# Patient Record
Sex: MALE | Race: WHITE | NOT HISPANIC OR LATINO | URBAN - METROPOLITAN AREA
[De-identification: names, ages, dates, MRNs, and addresses within clinical notes are randomized per-mention and may not be internally consistent; named-entity substitution may affect disease eponyms.]

---

## 2024-11-08 ENCOUNTER — OFFICE VISIT (OUTPATIENT)
Dept: URGENT CARE | Age: 24
End: 2024-11-08
Payer: COMMERCIAL

## 2024-11-08 VITALS
HEIGHT: 74 IN | WEIGHT: 200 LBS | DIASTOLIC BLOOD PRESSURE: 73 MMHG | RESPIRATION RATE: 16 BRPM | BODY MASS INDEX: 25.67 KG/M2 | SYSTOLIC BLOOD PRESSURE: 128 MMHG | OXYGEN SATURATION: 96 % | TEMPERATURE: 97.8 F | HEART RATE: 84 BPM

## 2024-11-08 DIAGNOSIS — J06.9 VIRAL URI: Primary | ICD-10-CM

## 2024-11-08 DIAGNOSIS — H65.91 OTHER NONSUPPURATIVE OTITIS MEDIA OF RIGHT EAR, UNSPECIFIED CHRONICITY: ICD-10-CM

## 2024-11-08 RX ORDER — AZITHROMYCIN 250 MG/1
TABLET, FILM COATED ORAL
Qty: 6 TABLET | Refills: 0 | Status: SHIPPED | OUTPATIENT
Start: 2024-11-08 | End: 2024-11-13

## 2024-11-08 ASSESSMENT — PAIN SCALES - GENERAL: PAINLEVEL_OUTOF10: 5

## 2024-11-08 NOTE — PROGRESS NOTES
"Subjective   Patient ID: Brad Meek is a 24 y.o. male. They present today with a chief complaint of URI (X 1 week, recently traveled), Sore Throat, Nasal Congestion, Fever, Cough, and Earache (Muffled sound).    History of Present Illness  HPI  7 Days of ear pain, right worse than left. Mild nasal congestion with postnasal drip. No fevers or chills. No eye redness, discharge or itching.  No blood or discharge noted from ear.  No ear tenderness.  No nausea vomiting or diarrhea. No chest pain, shortness of breath or wheezing noted. No rashes or skin lesions noted. No known exposures to Mono, strep, pneumonia or influenza. Over-the-counter medications taken for symptoms. Nonsmoker.  7   Past Medical History  Allergies as of 11/08/2024    (No Known Allergies)       (Not in a hospital admission)       No past medical history on file.    No past surgical history on file.     reports that he has never smoked. He has never used smokeless tobacco.    Review of Systems  Review of Systems       As in history of present illness                        Objective    Vitals:    11/08/24 0907   BP: 128/73   BP Location: Left arm   Patient Position: Sitting   BP Cuff Size: Large adult   Pulse: 84   Resp: 16   Temp: 36.6 °C (97.8 °F)   SpO2: 96%   Weight: 90.7 kg (200 lb)   Height: 1.88 m (6' 2\")     No LMP for male patient.    Physical Exam  Gen- A&O. NAD at rest.   Ears-ear canals appear normal bilaterally.  Bilateral effusions noted behind TMs, right TM red and dull  OP-no erythema or exudates. Some mucous in posterior OP   Neck- mild anterior lymphadenopathy  Lungs- clear to auscultation without wheezes or rhonchi  Skin-no rashes, hives or lesions  Procedures    Point of Care Test & Imaging Results from this visit  No results found for this visit on 11/08/24.   No results found.    Diagnostic study results (if any) were reviewed by Kole Caraballo MD.    Assessment/Plan   Allergies, medications, history, and pertinent " labs/EKGs/Imaging reviewed by Kole Caraballo MD.     Medical Decision Making  At time of discharge patient was clinically well-appearing and HDS for outpatient management. The patient and/or family was educated regarding diagnosis, supportive care, OTC and Rx medications. The patient and/or family was given the opportunity to ask questions prior to discharge.  They verbalized understanding of my discussion of the plans for treatment, expected course, indications to return to  or seek further evaluation in ED, and the need for timely follow up as directed.   They were provided with a work/school excuse if requested.    Orders and Diagnoses  Diagnoses and all orders for this visit:  Viral URI  Other nonsuppurative otitis media of right ear, unspecified chronicity  -     azithromycin (Zithromax) 250 mg tablet; Take 2 tabs (500 mg) by mouth today, than 1 daily for 4 days.      Medical Admin Record      Patient disposition: Home    Electronically signed by Kole Caraballo MD  9:21 AM

## 2025-05-27 ENCOUNTER — HOSPITAL ENCOUNTER (EMERGENCY)
Facility: HOSPITAL | Age: 25
Discharge: HOME | End: 2025-05-28
Attending: EMERGENCY MEDICINE
Payer: COMMERCIAL

## 2025-05-27 ENCOUNTER — APPOINTMENT (OUTPATIENT)
Dept: RADIOLOGY | Facility: HOSPITAL | Age: 25
End: 2025-05-27
Payer: COMMERCIAL

## 2025-05-27 ENCOUNTER — OFFICE VISIT (OUTPATIENT)
Dept: URGENT CARE | Age: 25
End: 2025-05-27
Payer: COMMERCIAL

## 2025-05-27 VITALS
OXYGEN SATURATION: 98 % | SYSTOLIC BLOOD PRESSURE: 125 MMHG | RESPIRATION RATE: 16 BRPM | BODY MASS INDEX: 26.31 KG/M2 | TEMPERATURE: 97.8 F | HEIGHT: 74 IN | HEART RATE: 62 BPM | DIASTOLIC BLOOD PRESSURE: 84 MMHG | WEIGHT: 205 LBS

## 2025-05-27 DIAGNOSIS — R10.9 ABDOMINAL PAIN, UNSPECIFIED ABDOMINAL LOCATION: ICD-10-CM

## 2025-05-27 DIAGNOSIS — R10.9 FLANK PAIN: Primary | ICD-10-CM

## 2025-05-27 DIAGNOSIS — K25.4 GASTRIC ULCER WITH HEMORRHAGE, UNSPECIFIED CHRONICITY: ICD-10-CM

## 2025-05-27 DIAGNOSIS — K25.9 GASTRIC ULCER, UNSPECIFIED CHRONICITY, UNSPECIFIED WHETHER GASTRIC ULCER HEMORRHAGE OR PERFORATION PRESENT: ICD-10-CM

## 2025-05-27 PROBLEM — M30.3 KAWASAKI DISEASE (MULTI): Status: ACTIVE | Noted: 2025-05-27

## 2025-05-27 LAB
ALBUMIN SERPL BCP-MCNC: 4.9 G/DL (ref 3.4–5)
ALP SERPL-CCNC: 76 U/L (ref 33–120)
ALT SERPL W P-5'-P-CCNC: 23 U/L (ref 10–52)
ANION GAP SERPL CALC-SCNC: 10 MMOL/L (ref 10–20)
APPEARANCE UR: CLEAR
AST SERPL W P-5'-P-CCNC: 21 U/L (ref 9–39)
BASOPHILS # BLD AUTO: 0.01 X10*3/UL (ref 0–0.1)
BASOPHILS NFR BLD AUTO: 0.1 %
BILIRUB SERPL-MCNC: 0.6 MG/DL (ref 0–1.2)
BILIRUB UR STRIP.AUTO-MCNC: NEGATIVE MG/DL
BUN SERPL-MCNC: 16 MG/DL (ref 6–23)
CALCIUM SERPL-MCNC: 9.3 MG/DL (ref 8.6–10.3)
CHLORIDE SERPL-SCNC: 101 MMOL/L (ref 98–107)
CO2 SERPL-SCNC: 31 MMOL/L (ref 21–32)
COLOR UR: NORMAL
CREAT SERPL-MCNC: 0.8 MG/DL (ref 0.5–1.3)
EGFRCR SERPLBLD CKD-EPI 2021: >90 ML/MIN/1.73M*2
EOSINOPHIL # BLD AUTO: 0.12 X10*3/UL (ref 0–0.7)
EOSINOPHIL NFR BLD AUTO: 1.8 %
ERYTHROCYTE [DISTWIDTH] IN BLOOD BY AUTOMATED COUNT: 11.6 % (ref 11.5–14.5)
GLUCOSE SERPL-MCNC: 95 MG/DL (ref 74–99)
GLUCOSE UR STRIP.AUTO-MCNC: NORMAL MG/DL
HCT VFR BLD AUTO: 44.1 % (ref 41–52)
HGB BLD-MCNC: 15.4 G/DL (ref 13.5–17.5)
IMM GRANULOCYTES # BLD AUTO: 0.01 X10*3/UL (ref 0–0.7)
IMM GRANULOCYTES NFR BLD AUTO: 0.1 % (ref 0–0.9)
KETONES UR STRIP.AUTO-MCNC: NEGATIVE MG/DL
LEUKOCYTE ESTERASE UR QL STRIP.AUTO: NEGATIVE
LYMPHOCYTES # BLD AUTO: 3.09 X10*3/UL (ref 1.2–4.8)
LYMPHOCYTES NFR BLD AUTO: 45.4 %
MCH RBC QN AUTO: 31.5 PG (ref 26–34)
MCHC RBC AUTO-ENTMCNC: 34.9 G/DL (ref 32–36)
MCV RBC AUTO: 90 FL (ref 80–100)
MONOCYTES # BLD AUTO: 0.5 X10*3/UL (ref 0.1–1)
MONOCYTES NFR BLD AUTO: 7.3 %
NEUTROPHILS # BLD AUTO: 3.08 X10*3/UL (ref 1.2–7.7)
NEUTROPHILS NFR BLD AUTO: 45.3 %
NITRITE UR QL STRIP.AUTO: NEGATIVE
NRBC BLD-RTO: 0 /100 WBCS (ref 0–0)
PH UR STRIP.AUTO: 6.5 [PH]
PLATELET # BLD AUTO: 267 X10*3/UL (ref 150–450)
POC APPEARANCE, URINE: CLEAR
POC BILIRUBIN, URINE: NEGATIVE
POC BLOOD, URINE: NEGATIVE
POC COLOR, URINE: NORMAL
POC GLUCOSE, URINE: NEGATIVE MG/DL
POC KETONES, URINE: NEGATIVE MG/DL
POC LEUKOCYTES, URINE: NEGATIVE
POC NITRITE,URINE: NEGATIVE
POC PH, URINE: 6 PH
POC PROTEIN, URINE: NEGATIVE MG/DL
POC SPECIFIC GRAVITY, URINE: 1.01
POC UROBILINOGEN, URINE: 0.2 EU/DL
POTASSIUM SERPL-SCNC: 4 MMOL/L (ref 3.5–5.3)
PROT SERPL-MCNC: 7.6 G/DL (ref 6.4–8.2)
PROT UR STRIP.AUTO-MCNC: NEGATIVE MG/DL
RBC # BLD AUTO: 4.89 X10*6/UL (ref 4.5–5.9)
RBC # UR STRIP.AUTO: NEGATIVE MG/DL
SODIUM SERPL-SCNC: 138 MMOL/L (ref 136–145)
SP GR UR STRIP.AUTO: 1.02
UROBILINOGEN UR STRIP.AUTO-MCNC: NORMAL MG/DL
WBC # BLD AUTO: 6.8 X10*3/UL (ref 4.4–11.3)

## 2025-05-27 PROCEDURE — 2550000001 HC RX 255 CONTRASTS: Performed by: EMERGENCY MEDICINE

## 2025-05-27 PROCEDURE — 96374 THER/PROPH/DIAG INJ IV PUSH: CPT | Mod: 59

## 2025-05-27 PROCEDURE — 2500000004 HC RX 250 GENERAL PHARMACY W/ HCPCS (ALT 636 FOR OP/ED): Mod: JZ

## 2025-05-27 PROCEDURE — 74177 CT ABD & PELVIS W/CONTRAST: CPT | Performed by: SURGERY

## 2025-05-27 PROCEDURE — 96375 TX/PRO/DX INJ NEW DRUG ADDON: CPT

## 2025-05-27 PROCEDURE — 74177 CT ABD & PELVIS W/CONTRAST: CPT

## 2025-05-27 PROCEDURE — 99285 EMERGENCY DEPT VISIT HI MDM: CPT | Performed by: EMERGENCY MEDICINE

## 2025-05-27 PROCEDURE — 80053 COMPREHEN METABOLIC PANEL: CPT | Performed by: EMERGENCY MEDICINE

## 2025-05-27 PROCEDURE — 99285 EMERGENCY DEPT VISIT HI MDM: CPT | Mod: 25 | Performed by: EMERGENCY MEDICINE

## 2025-05-27 PROCEDURE — 36415 COLL VENOUS BLD VENIPUNCTURE: CPT | Performed by: EMERGENCY MEDICINE

## 2025-05-27 PROCEDURE — 81003 URINALYSIS AUTO W/O SCOPE: CPT | Performed by: EMERGENCY MEDICINE

## 2025-05-27 PROCEDURE — 85025 COMPLETE CBC W/AUTO DIFF WBC: CPT | Performed by: EMERGENCY MEDICINE

## 2025-05-27 RX ORDER — SUCRALFATE 1 G/10ML
1 SUSPENSION ORAL ONCE
Status: COMPLETED | OUTPATIENT
Start: 2025-05-27 | End: 2025-05-28

## 2025-05-27 RX ORDER — KETOROLAC TROMETHAMINE 15 MG/ML
15 INJECTION, SOLUTION INTRAMUSCULAR; INTRAVENOUS ONCE
Status: COMPLETED | OUTPATIENT
Start: 2025-05-27 | End: 2025-05-27

## 2025-05-27 RX ORDER — PANTOPRAZOLE SODIUM 40 MG/10ML
80 INJECTION, POWDER, LYOPHILIZED, FOR SOLUTION INTRAVENOUS ONCE
Status: COMPLETED | OUTPATIENT
Start: 2025-05-27 | End: 2025-05-27

## 2025-05-27 RX ORDER — TOBRAMYCIN 3 MG/ML
SOLUTION/ DROPS OPHTHALMIC
COMMUNITY
Start: 2024-09-05 | End: 2025-05-28 | Stop reason: ENTERED-IN-ERROR

## 2025-05-27 RX ORDER — ONABOTULINUMTOXINA 100 [USP'U]/1
INJECTION, POWDER, LYOPHILIZED, FOR SOLUTION INTRADERMAL; INTRAMUSCULAR
COMMUNITY
Start: 2024-09-27 | End: 2025-05-28 | Stop reason: ENTERED-IN-ERROR

## 2025-05-27 RX ORDER — PANTOPRAZOLE SODIUM 40 MG/1
40 TABLET, DELAYED RELEASE ORAL
Qty: 30 TABLET | Refills: 0 | Status: SHIPPED | OUTPATIENT
Start: 2025-05-27 | End: 2025-06-26

## 2025-05-27 RX ADMIN — KETOROLAC TROMETHAMINE 15 MG: 15 INJECTION, SOLUTION INTRAMUSCULAR; INTRAVENOUS at 21:49

## 2025-05-27 RX ADMIN — IOHEXOL 75 ML: 350 INJECTION, SOLUTION INTRAVENOUS at 22:16

## 2025-05-27 RX ADMIN — PANTOPRAZOLE SODIUM 80 MG: 40 INJECTION, POWDER, FOR SOLUTION INTRAVENOUS at 23:36

## 2025-05-27 ASSESSMENT — ENCOUNTER SYMPTOMS
DYSURIA: 0
FREQUENCY: 0
DIFFICULTY URINATING: 0
ABDOMINAL PAIN: 1
BLOOD IN STOOL: 0
RESPIRATORY NEGATIVE: 1
VOMITING: 0
CARDIOVASCULAR NEGATIVE: 1
CHILLS: 0
NAUSEA: 0
FEVER: 0
HEMATURIA: 0
FLANK PAIN: 0
CONSTITUTIONAL NEGATIVE: 1
CONSTIPATION: 0
DIARRHEA: 0

## 2025-05-27 ASSESSMENT — PAIN DESCRIPTION - ORIENTATION
ORIENTATION: LEFT
ORIENTATION: LEFT

## 2025-05-27 ASSESSMENT — PAIN SCALES - GENERAL
PAINLEVEL_OUTOF10: 5 - MODERATE PAIN
PAINLEVEL_OUTOF10: 4
PAINLEVEL_OUTOF10: 4

## 2025-05-27 ASSESSMENT — LIFESTYLE VARIABLES
HAVE PEOPLE ANNOYED YOU BY CRITICIZING YOUR DRINKING: NO
EVER FELT BAD OR GUILTY ABOUT YOUR DRINKING: NO
TOTAL SCORE: 0
HAVE YOU EVER FELT YOU SHOULD CUT DOWN ON YOUR DRINKING: NO
EVER HAD A DRINK FIRST THING IN THE MORNING TO STEADY YOUR NERVES TO GET RID OF A HANGOVER: NO

## 2025-05-27 ASSESSMENT — PAIN DESCRIPTION - LOCATION
LOCATION: ABDOMEN
LOCATION: BACK

## 2025-05-27 ASSESSMENT — PAIN DESCRIPTION - PAIN TYPE
TYPE: ACUTE PAIN
TYPE: ACUTE PAIN

## 2025-05-27 ASSESSMENT — COLUMBIA-SUICIDE SEVERITY RATING SCALE - C-SSRS
2. HAVE YOU ACTUALLY HAD ANY THOUGHTS OF KILLING YOURSELF?: NO
1. IN THE PAST MONTH, HAVE YOU WISHED YOU WERE DEAD OR WISHED YOU COULD GO TO SLEEP AND NOT WAKE UP?: NO
6. HAVE YOU EVER DONE ANYTHING, STARTED TO DO ANYTHING, OR PREPARED TO DO ANYTHING TO END YOUR LIFE?: NO

## 2025-05-27 ASSESSMENT — PAIN - FUNCTIONAL ASSESSMENT
PAIN_FUNCTIONAL_ASSESSMENT: 0-10
PAIN_FUNCTIONAL_ASSESSMENT: 0-10

## 2025-05-27 NOTE — PROGRESS NOTES
Subjective   Patient ID: Brad Meek is a 24 y.o. male. They present today with a chief complaint of Physical (Ongoing sharp pain in left side - Entered by patient) and Abdominal Pain (Left side flank pain for 4 weeks).    History of Present Illness  Brad Meek is a 24 y.o. male with history of no chronic medical problems who presents with left flank pain with NKI. Patient has tried nothing. Patient can't get comfortable.     - Symptoms began 3-4 weeks ago. Onset was sudden.   - Severity: severe, at its worst   - Timing: intermittent   - Quality: cramping   - Pain is exacerbated by movement. Pain radiates laterally.    - Pain is not exacerbated by movement.    - Symptoms are associated with nothing.   - Symptoms are not associated with back pain, bloody/discolored urine, diarrhea, dysuria, fever, nausea, urgency, urinary frequency, and urinary retention.   - Improved by nothing.   - Not improved by cold compresses, warm compresses, OTC medications, rest, and urination.        History provided by:  Patient and medical records  Abdominal Pain  Pertinent negatives include no constipation, diarrhea, dysuria, fever, frequency, hematuria, nausea or vomiting.       Past Medical History  Allergies as of 05/27/2025    (No Known Allergies)       Prescriptions Prior to Admission[1]     Medical History[2]    Surgical History[3]     reports that he has never smoked. He has never used smokeless tobacco. He reports current alcohol use of about 3.0 standard drinks of alcohol per week. He reports that he does not use drugs.    Review of Systems  Review of Systems   Constitutional: Negative.  Negative for chills and fever.   HENT: Negative.     Respiratory: Negative.     Cardiovascular: Negative.    Gastrointestinal:  Positive for abdominal pain. Negative for blood in stool, constipation, diarrhea, nausea and vomiting.   Genitourinary:  Negative for difficulty urinating, dysuria, flank pain, frequency, hematuria, penile  "discharge, penile pain, testicular pain and urgency.   All other systems reviewed and are negative.                                 Objective    Vitals:    05/27/25 1809   BP: 125/84   BP Location: Left arm   Patient Position: Sitting   BP Cuff Size: Adult   Pulse: 62   Resp: 16   Temp: 36.6 °C (97.8 °F)   TempSrc: Oral   SpO2: 98%   Weight: 93 kg (205 lb)   Height: 1.88 m (6' 2\")     No LMP for male patient.    Physical Exam  Vitals and nursing note reviewed.   Constitutional:       Appearance: He is not ill-appearing or toxic-appearing.   HENT:      Head: Normocephalic and atraumatic.   Cardiovascular:      Rate and Rhythm: Normal rate and regular rhythm.      Pulses: Normal pulses.      Heart sounds: Normal heart sounds.   Pulmonary:      Effort: Pulmonary effort is normal.      Breath sounds: Normal breath sounds.   Abdominal:      General: Abdomen is flat. Bowel sounds are normal. There is no distension.      Palpations: Abdomen is soft. There is no hepatomegaly or splenomegaly.      Tenderness: There is no abdominal tenderness. There is no right CVA tenderness, left CVA tenderness, guarding or rebound.      Hernia: No hernia is present.      Comments: Pt indicating pain is visceral lateral to spine at T2-T4 level, radiating lateral to side.   Skin:     General: Skin is warm and dry.      Capillary Refill: Capillary refill takes less than 2 seconds.   Neurological:      Mental Status: He is alert and oriented to person, place, and time.   Psychiatric:         Behavior: Behavior normal.         Procedures    Point of Care Test & Imaging Results from this visit  Results for orders placed or performed in visit on 05/27/25   POCT UA Automated manually resulted   Result Value Ref Range    POC Color, Urine Light-Yellow Straw, Yellow, Light-Yellow    POC Appearance, Urine Clear Clear    POC Glucose, Urine NEGATIVE NEGATIVE mg/dl    POC Bilirubin, Urine NEGATIVE NEGATIVE    POC Ketones, Urine NEGATIVE NEGATIVE mg/dl    " POC Specific Gravity, Urine 1.010 1.005 - 1.035    POC Blood, Urine NEGATIVE NEGATIVE    POC PH, Urine 6.0 No Reference Range Established PH    POC Protein, Urine NEGATIVE NEGATIVE mg/dl    POC Urobilinogen, Urine 0.2 0.2, 1.0 EU/DL    Poc Nitrite, Urine NEGATIVE NEGATIVE    POC Leukocytes, Urine NEGATIVE NEGATIVE      Imaging  No results found.    Cardiology, Vascular, and Other Imaging  No other imaging results found for the past 2 days      Diagnostic study results (if any) were reviewed by TASNEEM Otoole.    Assessment/Plan   Allergies, medications, history, and pertinent labs/EKGs/Imaging reviewed by TASNEEM Otoole.     Medical Decision Making  After discussing possible diagnosis with pt, it decided mutually that pt needs further evaluation at ER. Pt states he will drive himself there. Discussed the risks of doing so, and pt states understanding. Pt will present to the nearest ER.     Orders and Diagnoses  Diagnoses and all orders for this visit:  Abdominal pain, unspecified abdominal location  -     POCT UA Automated manually resulted      Medical Admin Record      Patient disposition: ED    Electronically signed by TASNEEM Otoole  6:56 PM           [1] (Not in a hospital admission)   [2] History reviewed. No pertinent past medical history.  [3] History reviewed. No pertinent surgical history.

## 2025-05-28 ENCOUNTER — ANESTHESIA EVENT (OUTPATIENT)
Dept: GASTROENTEROLOGY | Facility: HOSPITAL | Age: 25
End: 2025-05-28
Payer: COMMERCIAL

## 2025-05-28 ENCOUNTER — APPOINTMENT (OUTPATIENT)
Dept: GASTROENTEROLOGY | Facility: HOSPITAL | Age: 25
End: 2025-05-28
Payer: COMMERCIAL

## 2025-05-28 ENCOUNTER — ANESTHESIA (OUTPATIENT)
Dept: GASTROENTEROLOGY | Facility: HOSPITAL | Age: 25
End: 2025-05-28
Payer: COMMERCIAL

## 2025-05-28 ENCOUNTER — TELEPHONE (OUTPATIENT)
Dept: PRIMARY CARE | Facility: CLINIC | Age: 25
End: 2025-05-28
Payer: COMMERCIAL

## 2025-05-28 VITALS
SYSTOLIC BLOOD PRESSURE: 120 MMHG | TEMPERATURE: 97.7 F | BODY MASS INDEX: 26.31 KG/M2 | RESPIRATION RATE: 16 BRPM | HEIGHT: 74 IN | HEART RATE: 60 BPM | WEIGHT: 205 LBS | OXYGEN SATURATION: 99 % | DIASTOLIC BLOOD PRESSURE: 63 MMHG

## 2025-05-28 LAB
ABO GROUP (TYPE) IN BLOOD: NORMAL
ALBUMIN SERPL BCP-MCNC: 4.2 G/DL (ref 3.4–5)
ALP SERPL-CCNC: 66 U/L (ref 33–120)
ALT SERPL W P-5'-P-CCNC: 18 U/L (ref 10–52)
ANION GAP SERPL CALC-SCNC: 9 MMOL/L (ref 10–20)
ANTIBODY SCREEN: NORMAL
AST SERPL W P-5'-P-CCNC: 17 U/L (ref 9–39)
BILIRUB SERPL-MCNC: 0.6 MG/DL (ref 0–1.2)
BUN SERPL-MCNC: 17 MG/DL (ref 6–23)
CALCIUM SERPL-MCNC: 9 MG/DL (ref 8.6–10.3)
CHLORIDE SERPL-SCNC: 102 MMOL/L (ref 98–107)
CO2 SERPL-SCNC: 30 MMOL/L (ref 21–32)
CREAT SERPL-MCNC: 1 MG/DL (ref 0.5–1.3)
EGFRCR SERPLBLD CKD-EPI 2021: >90 ML/MIN/1.73M*2
ERYTHROCYTE [DISTWIDTH] IN BLOOD BY AUTOMATED COUNT: 11.6 % (ref 11.5–14.5)
GLUCOSE SERPL-MCNC: 91 MG/DL (ref 74–99)
HCT VFR BLD AUTO: 42.4 % (ref 41–52)
HGB BLD-MCNC: 14.8 G/DL (ref 13.5–17.5)
HOLD SPECIMEN: NORMAL
MCH RBC QN AUTO: 32 PG (ref 26–34)
MCHC RBC AUTO-ENTMCNC: 34.9 G/DL (ref 32–36)
MCV RBC AUTO: 92 FL (ref 80–100)
NRBC BLD-RTO: 0 /100 WBCS (ref 0–0)
PLATELET # BLD AUTO: 247 X10*3/UL (ref 150–450)
POTASSIUM SERPL-SCNC: 4.2 MMOL/L (ref 3.5–5.3)
PROT SERPL-MCNC: 6.5 G/DL (ref 6.4–8.2)
RBC # BLD AUTO: 4.63 X10*6/UL (ref 4.5–5.9)
RH FACTOR (ANTIGEN D): NORMAL
SODIUM SERPL-SCNC: 137 MMOL/L (ref 136–145)
WBC # BLD AUTO: 7.3 X10*3/UL (ref 4.4–11.3)

## 2025-05-28 PROCEDURE — 7100000010 HC PHASE TWO TIME - EACH INCREMENTAL 1 MINUTE

## 2025-05-28 PROCEDURE — 3700000002 HC GENERAL ANESTHESIA TIME - EACH INCREMENTAL 1 MINUTE

## 2025-05-28 PROCEDURE — 2500000004 HC RX 250 GENERAL PHARMACY W/ HCPCS (ALT 636 FOR OP/ED): Mod: JZ | Performed by: ANESTHESIOLOGIST ASSISTANT

## 2025-05-28 PROCEDURE — 99223 1ST HOSP IP/OBS HIGH 75: CPT | Performed by: INTERNAL MEDICINE

## 2025-05-28 PROCEDURE — A43239 PR EDG TRANSORAL BIOPSY SINGLE/MULTIPLE: Performed by: ANESTHESIOLOGY

## 2025-05-28 PROCEDURE — 36415 COLL VENOUS BLD VENIPUNCTURE: CPT | Performed by: INTERNAL MEDICINE

## 2025-05-28 PROCEDURE — 7100000001 HC RECOVERY ROOM TIME - INITIAL BASE CHARGE

## 2025-05-28 PROCEDURE — 99223 1ST HOSP IP/OBS HIGH 75: CPT | Performed by: NURSE PRACTITIONER

## 2025-05-28 PROCEDURE — 7100000009 HC PHASE TWO TIME - INITIAL BASE CHARGE

## 2025-05-28 PROCEDURE — 7100000002 HC RECOVERY ROOM TIME - EACH INCREMENTAL 1 MINUTE

## 2025-05-28 PROCEDURE — 99418 PROLNG IP/OBS E/M EA 15 MIN: CPT | Performed by: NURSE PRACTITIONER

## 2025-05-28 PROCEDURE — 80053 COMPREHEN METABOLIC PANEL: CPT | Performed by: INTERNAL MEDICINE

## 2025-05-28 PROCEDURE — 88305 TISSUE EXAM BY PATHOLOGIST: CPT | Mod: TC,STJLAB | Performed by: INTERNAL MEDICINE

## 2025-05-28 PROCEDURE — 2500000004 HC RX 250 GENERAL PHARMACY W/ HCPCS (ALT 636 FOR OP/ED): Mod: JZ | Performed by: INTERNAL MEDICINE

## 2025-05-28 PROCEDURE — 86901 BLOOD TYPING SEROLOGIC RH(D): CPT

## 2025-05-28 PROCEDURE — A43239 PR EDG TRANSORAL BIOPSY SINGLE/MULTIPLE: Performed by: ANESTHESIOLOGIST ASSISTANT

## 2025-05-28 PROCEDURE — 3700000001 HC GENERAL ANESTHESIA TIME - INITIAL BASE CHARGE

## 2025-05-28 PROCEDURE — 43239 EGD BIOPSY SINGLE/MULTIPLE: CPT | Performed by: INTERNAL MEDICINE

## 2025-05-28 PROCEDURE — 2500000002 HC RX 250 W HCPCS SELF ADMINISTERED DRUGS (ALT 637 FOR MEDICARE OP, ALT 636 FOR OP/ED)

## 2025-05-28 PROCEDURE — 85027 COMPLETE CBC AUTOMATED: CPT | Performed by: INTERNAL MEDICINE

## 2025-05-28 PROCEDURE — 86900 BLOOD TYPING SEROLOGIC ABO: CPT

## 2025-05-28 PROCEDURE — 96376 TX/PRO/DX INJ SAME DRUG ADON: CPT | Mod: 59

## 2025-05-28 PROCEDURE — 43239 EGD BIOPSY SINGLE/MULTIPLE: CPT

## 2025-05-28 PROCEDURE — 96375 TX/PRO/DX INJ NEW DRUG ADDON: CPT | Mod: 59

## 2025-05-28 RX ORDER — MORPHINE SULFATE 2 MG/ML
2 INJECTION, SOLUTION INTRAMUSCULAR; INTRAVENOUS EVERY 4 HOURS PRN
Status: DISCONTINUED | OUTPATIENT
Start: 2025-05-28 | End: 2025-05-28 | Stop reason: HOSPADM

## 2025-05-28 RX ORDER — MORPHINE SULFATE 4 MG/ML
4 INJECTION, SOLUTION INTRAMUSCULAR; INTRAVENOUS EVERY 4 HOURS PRN
Status: DISCONTINUED | OUTPATIENT
Start: 2025-05-28 | End: 2025-05-28 | Stop reason: HOSPADM

## 2025-05-28 RX ORDER — ACETAMINOPHEN 650 MG/1
650 SUPPOSITORY RECTAL EVERY 4 HOURS PRN
Status: DISCONTINUED | OUTPATIENT
Start: 2025-05-28 | End: 2025-05-28 | Stop reason: HOSPADM

## 2025-05-28 RX ORDER — ACETAMINOPHEN 160 MG/5ML
650 SOLUTION ORAL EVERY 4 HOURS PRN
Status: DISCONTINUED | OUTPATIENT
Start: 2025-05-28 | End: 2025-05-28 | Stop reason: HOSPADM

## 2025-05-28 RX ORDER — SODIUM CHLORIDE 9 MG/ML
100 INJECTION, SOLUTION INTRAVENOUS CONTINUOUS
Status: ACTIVE | OUTPATIENT
Start: 2025-05-28 | End: 2025-05-28

## 2025-05-28 RX ORDER — TALC
3 POWDER (GRAM) TOPICAL NIGHTLY PRN
Status: DISCONTINUED | OUTPATIENT
Start: 2025-05-28 | End: 2025-05-28 | Stop reason: HOSPADM

## 2025-05-28 RX ORDER — PANTOPRAZOLE SODIUM 40 MG/10ML
40 INJECTION, POWDER, LYOPHILIZED, FOR SOLUTION INTRAVENOUS 2 TIMES DAILY
Status: DISCONTINUED | OUTPATIENT
Start: 2025-05-28 | End: 2025-05-28 | Stop reason: HOSPADM

## 2025-05-28 RX ORDER — ACETAMINOPHEN 325 MG/1
650 TABLET ORAL EVERY 4 HOURS PRN
Status: DISCONTINUED | OUTPATIENT
Start: 2025-05-28 | End: 2025-05-28 | Stop reason: HOSPADM

## 2025-05-28 RX ORDER — PROPOFOL 10 MG/ML
INJECTION, EMULSION INTRAVENOUS CONTINUOUS PRN
Status: DISCONTINUED | OUTPATIENT
Start: 2025-05-28 | End: 2025-05-28

## 2025-05-28 RX ORDER — ONDANSETRON HYDROCHLORIDE 2 MG/ML
4 INJECTION, SOLUTION INTRAVENOUS EVERY 8 HOURS PRN
Status: DISCONTINUED | OUTPATIENT
Start: 2025-05-28 | End: 2025-05-28 | Stop reason: HOSPADM

## 2025-05-28 RX ORDER — ONDANSETRON 4 MG/1
4 TABLET, ORALLY DISINTEGRATING ORAL EVERY 8 HOURS PRN
Status: DISCONTINUED | OUTPATIENT
Start: 2025-05-28 | End: 2025-05-28 | Stop reason: HOSPADM

## 2025-05-28 RX ORDER — FENTANYL CITRATE 50 UG/ML
INJECTION, SOLUTION INTRAMUSCULAR; INTRAVENOUS AS NEEDED
Status: DISCONTINUED | OUTPATIENT
Start: 2025-05-28 | End: 2025-05-28

## 2025-05-28 RX ADMIN — SODIUM CHLORIDE, SODIUM LACTATE, POTASSIUM CHLORIDE, AND CALCIUM CHLORIDE: .6; .31; .03; .02 INJECTION, SOLUTION INTRAVENOUS at 11:53

## 2025-05-28 RX ADMIN — SODIUM CHLORIDE 100 ML/HR: 0.9 INJECTION, SOLUTION INTRAVENOUS at 01:01

## 2025-05-28 RX ADMIN — PROPOFOL 50 MG: 10 INJECTION, EMULSION INTRAVENOUS at 12:00

## 2025-05-28 RX ADMIN — PROPOFOL 50 MG: 10 INJECTION, EMULSION INTRAVENOUS at 12:02

## 2025-05-28 RX ADMIN — FENTANYL CITRATE 50 MCG: 50 INJECTION, SOLUTION INTRAMUSCULAR; INTRAVENOUS at 11:58

## 2025-05-28 RX ADMIN — MORPHINE SULFATE 2 MG: 2 INJECTION, SOLUTION INTRAMUSCULAR; INTRAVENOUS at 04:22

## 2025-05-28 RX ADMIN — MORPHINE SULFATE 2 MG: 2 INJECTION, SOLUTION INTRAMUSCULAR; INTRAVENOUS at 10:16

## 2025-05-28 RX ADMIN — PANTOPRAZOLE SODIUM 40 MG: 40 INJECTION, POWDER, FOR SOLUTION INTRAVENOUS at 06:57

## 2025-05-28 RX ADMIN — PROPOFOL 80 MG: 10 INJECTION, EMULSION INTRAVENOUS at 11:59

## 2025-05-28 RX ADMIN — PROPOFOL 250 MCG/KG/MIN: 10 INJECTION, EMULSION INTRAVENOUS at 11:58

## 2025-05-28 RX ADMIN — SUCRALFATE 1 G: 1 SUSPENSION ORAL at 00:33

## 2025-05-28 ASSESSMENT — PAIN DESCRIPTION - ORIENTATION: ORIENTATION: LEFT

## 2025-05-28 ASSESSMENT — PAIN - FUNCTIONAL ASSESSMENT
PAIN_FUNCTIONAL_ASSESSMENT: 0-10

## 2025-05-28 ASSESSMENT — PAIN SCALES - GENERAL
PAINLEVEL_OUTOF10: 0 - NO PAIN
PAINLEVEL_OUTOF10: 1
PAINLEVEL_OUTOF10: 6
PAINLEVEL_OUTOF10: 6
PAINLEVEL_OUTOF10: 1
PAINLEVEL_OUTOF10: 3
PAINLEVEL_OUTOF10: 6
PAINLEVEL_OUTOF10: 4
PAINLEVEL_OUTOF10: 1
PAINLEVEL_OUTOF10: 0 - NO PAIN

## 2025-05-28 ASSESSMENT — COGNITIVE AND FUNCTIONAL STATUS - GENERAL
MOBILITY SCORE: 24
DAILY ACTIVITIY SCORE: 24

## 2025-05-28 ASSESSMENT — PAIN DESCRIPTION - DESCRIPTORS
DESCRIPTORS: CRUSHING
DESCRIPTORS: CRAMPING

## 2025-05-28 ASSESSMENT — ENCOUNTER SYMPTOMS: ABDOMINAL PAIN: 1

## 2025-05-28 ASSESSMENT — PAIN DESCRIPTION - LOCATION: LOCATION: ABDOMEN

## 2025-05-28 NOTE — ASSESSMENT & PLAN NOTE
Flank pain secondary to the above.  Patient will be n.p.o. after midnight.  He may still have p.o. medications with sips of water if necessary however all attempts will be made to convert medications to IV if possible.  IV morphine, 2 mg versus 4 mg will be made available per pain level as reported by patient.  Additional breakthrough dosing may be made available as indicated.

## 2025-05-28 NOTE — ANESTHESIA POSTPROCEDURE EVALUATION
Patient: Brad Meke    Procedure Summary       Date: 05/28/25 Room / Location: [facility]    Anesthesia Start: 1148 Anesthesia Stop: 1210    Procedure: EGD Diagnosis:       Gastric ulcer, unspecified chronicity, unspecified whether gastric ulcer hemorrhage or perforation present      Gastric ulcer with hemorrhage, unspecified chronicity    Scheduled Providers: Jose Khan MD; Shakir Thompson MD Responsible Provider: Jose Khan MD    Anesthesia Type: MAC ASA Status: 2            Anesthesia Type: MAC    Vitals Value Taken Time   /53 05/28/25 12:21   Temp 36.3 05/28/25 12:23   Pulse 86 05/28/25 12:23   Resp 20 05/28/25 12:23   SpO2 90 % 05/28/25 12:23   Vitals shown include unfiled device data.    Anesthesia Post Evaluation    Patient location during evaluation: PACU  Patient participation: complete - patient participated  Level of consciousness: awake and alert  Pain management: adequate  Airway patency: patent  Cardiovascular status: acceptable  Respiratory status: acceptable  Hydration status: acceptable  Postoperative Nausea and Vomiting: none        No notable events documented.  36

## 2025-05-28 NOTE — DISCHARGE SUMMARY
Discharge Diagnosis  Gastritis           Issues Requiring Follow-Up  PCP follow up    Discharge Meds     Medication List      START taking these medications     pantoprazole 40 mg EC tablet; Commonly known as: ProtoNix; Take 1 tablet   (40 mg) by mouth once daily in the morning. Take before meals. Do not   crush, chew, or split.       Test Results Pending At Discharge  Pending Labs       Order Current Status    Surgical Pathology Exam Collected (05/28/25 0682)            Hospital Course     Brad Meek is a 24 y.o. male presenting with chief complaint of right sided flank pain that has been ongoing for the last 2 weeks.  Imaging studies obtained at the time of admission show evidence of a gastric ulcer with concern for active hemorrhage.   Patient was admitted and was started on IV Protonix.  GI was consulted and patient had EGD which showed mild gastritis.  Patient has been cleared for discharge from GI standpoint.  Recommended PPI 40 mg daily and to avoid alcohol and marijuana.  Patient will be discharged in stable condition.  Advised him to follow with his PCP as outpatient in 1 to 2 weeks.    Pertinent Physical Exam At Time of Discharge  Physical Exam    Constitutional: No acute distress, awake, alert  Head/Neck: Neck supple  Respiratory/Thorax: Lungs are Clear to auscultation  Cardiovascular: Regular, rate and rhythm,  2+ equal pulses of the extremities, normal S 1and S 2  Gastrointestinal: Nondistended, soft, non-tender, no rebound tenderness or guarding  Extremities: No edema. No calf tenderness.  Neurological: Awake and alert. No focal neurological deficits  Psychological: Appropriate mood and behavior    Outpatient Follow-Up  Future Appointments   Date Time Provider Department [place]   5/28/2025 12:30 PM [facility] ADD-ONS [id number] [place]   5/30/2025  9:00 AM Ruiz Khan MD [id number] [place]   8/27/2025  2:30 PM Caron Clemens MD [id number] [place]         Pankaj Oconnor MD

## 2025-05-28 NOTE — ED PROVIDER NOTES
Emergency Department Provider Note        History of Present Illness     History provided by: Patient  Limitations to History: None  External Records Reviewed with Brief Summary: Medical chart review    HPI:  Brad Meek is a 24 y.o. male  arrives with a chief complaint of paroxysmal right flank pain that is not propagated or palliated by any particular thing for the last 2 weeks, states that occasionally the pain is in the left flank, occasionally it radiates to the left front left lower quadrant.  He denies blood in stool blood in urine, denies stooling changes, denies dysuria, denies injury to the flank, denies chest pain, denies nausea or vomiting.  States he has no significant medical history no relevant family history no allergies to medications and takes no daily medicines.  He was seen at an urgent care earlier today where urinalysis was performed and he was instructed come to the ER for further evaluation.    Physical Exam   Triage vitals:  T 36.8 °C (98.2 °F)  HR 67  /71  RR 20  O2 97 % None (Room air)    General: Awake, alert, in no acute distress  Eyes: Gaze conjugate.  No scleral icterus or injection  HENT: Normo-cephalic, atraumatic. No stridor  CV: Regular rate, regular rhythm. Radial pulses 2+ bilaterally  Resp: Breathing non-labored, speaking in full sentences.  Clear to auscultation bilaterally  GI: Soft, non-distended, non-tender. No rebound or guarding.  : Deferred  MSK/Extremities: No gross bony deformities. Moving all extremities  Skin: Warm. Appropriate color  Neuro: Alert. Oriented. Face symmetric. Speech is fluent.  Gross strength and sensation intact in b/l UE and LEs  Psych: Appropriate mood and affect    Medical Decision Making & ED Course   Medical Decision Makin y.o. male with no significant medical history who arrives with paroxysmal left flank pain for the last 2 weeks, sent from urgent care for further evaluation.  CBC, CMP, lipase, urinalysis, CT abdomen pelvis  with IV contrast to evaluate for renal lithiasis or intra-abdominal pathology ordered, patient given a dose of Toradol 15 mg after renal function was evaluated.  Please see ED course for further medical decision making.  ----       Social Determinants of Health which Significantly Impact Care: None identified         Independent Result Review and Interpretation: Relevant laboratory and radiographic results were reviewed and independently interpreted by myself.  As necessary, they are commented on in the ED Course.    Chronic conditions affecting the patient's care: As documented above in [facility]    The patient was discussed with the following consultants/services: None    Care Considerations: As documented above in [facility]    ED Course:  ED Course as of 05/28/25 2111   Tue May 27, 2025   2150 Patient states he does not have a primary care provider, primary care provider was provided for him, referred to family medicine. [PV]   2320 CT abdomen pelvis w IV contrast  IMPRESSION:  Thickening of the posterior proximal gastric wall with  hyperattenuation and trapped foci of air compatible with ulceration  and hemorrhage (series 201, images 28-36).      Otherwise, no definite evidence of acute pathology.      Partially imaged small hydroceles. Correlate with any scrotal  symptoms.      Moderate to large volume of colonic stool suggesting constipation.   [PV]   2320 Patient given a dose of 80 mg Protonix for treatment of possible gastric ulcer [PV]   2320 Patient also given follow-up with gastroenterology and primary care for suspected gastric ulcer. [PV]   2352 Patient was discussed with gastroenterology, GI agrees to be placed on consult will see the patient tomorrow morning.  Patient loaded with Protonix 80 mg for initial proton pump inhibitor and acid reduction.  Patient given dose of Carafate 1 g suspension for gastric lining coating.  Type and screen ordered out of precaution for possible blood loss during hospitalization.   Results were discussed with the patient, he was amenable to admission to the hospital for further treatment and evaluation.  Patient admitted for further treatment evaluation of gastric ulceration with hemorrhage [PV]      ED Course User Index  [PV] Sandor Cifuentes DO         Diagnoses as of 05/28/25 2111   Flank pain   Gastric ulcer, unspecified chronicity, unspecified whether gastric ulcer hemorrhage or perforation present   Gastric ulcer with hemorrhage, unspecified chronicity     Disposition   As a result of their workup, the patient will require admission to the hospital.  The patient was informed of his diagnosis.  The patient was given the opportunity to ask questions and I answered them. The patient agreed to be admitted to the hospital.    Procedures   Procedures    Patient seen and discussed with ED attending physician.    Don Mercado DO  Emergency Medicine     Sandor Cifuentes DO  Resident  05/27/25 3092        The patient was seen by the resident/fellow.  I have personally performed a substantive portion of the encounter.  I have seen and examined the patient; agree with the workup, evaluation, MDM, management and diagnosis.  The care plan has been discussed with the resident; I have reviewed the resident’s note and agree with the documented findings.                                                                             Don Mercado DO  05/28/25 2111

## 2025-05-28 NOTE — H&P
History Of Present Illness  Brad Meek is a 24 y.o. male presenting with chief complaint of right sided flank pain that has been ongoing for the last 2 weeks.  The pain does migrate with reports of both left and right sided pain that are best described as paroxysmal.  He denies any recent travel, medication changes or sick contacts.  Denies any evidence of melena or hematochezia.  Denies any significant past medical history.  Denies the use of chronic NSAIDs such as ibuprofen.  Evaluation in the emergency department including CT scan of the abdomen/pelvis did reveal thickening of the posterior proximal gastric wall with hyperattenuation and a tramp foci of air compatible with ulceration and active hemorrhage.  These findings were discussed with GI service on-call.  They recommend PPI, n.p.o., and will evaluate patient in a.m. for likely EGD.  Admitted for further management..     Past Medical History  Medical History[1]    Surgical History  Surgical History[2]     Social History  He reports that he has never smoked. He has never used smokeless tobacco. He reports current alcohol use of about 3.0 standard drinks of alcohol per week. He reports that he does not use drugs.    Family History  Family History[3]     Allergies  Patient has no known allergies.    Review of Systems   Gastrointestinal:  Positive for abdominal pain.        Physical Exam  Vitals reviewed.   Constitutional:       Appearance: Normal appearance.   HENT:      Head: Normocephalic and atraumatic.      Nose: Nose normal.      Mouth/Throat:      Mouth: Mucous membranes are moist.   Eyes:      Extraocular Movements: Extraocular movements intact.      Conjunctiva/sclera: Conjunctivae normal.      Pupils: Pupils are equal, round, and reactive to light.   Cardiovascular:      Rate and Rhythm: Normal rate and regular rhythm.      Pulses: Normal pulses.      Heart sounds: Normal heart sounds.   Pulmonary:      Effort: Pulmonary effort is normal.       "Breath sounds: Normal breath sounds.   Abdominal:      General: Bowel sounds are normal.      Palpations: Abdomen is soft.      Tenderness: There is abdominal tenderness.   Musculoskeletal:         General: Normal range of motion.      Cervical back: Normal range of motion and neck supple.   Skin:     General: Skin is warm and dry.   Neurological:      General: No focal deficit present.      Mental Status: He is alert. Mental status is at baseline.   Psychiatric:         Mood and Affect: Mood normal.         Behavior: Behavior normal.          Last Recorded Vitals  Blood pressure 124/82, pulse 55, temperature 36.7 °C (98 °F), temperature source Temporal, resp. rate 18, height 1.88 m (6' 2\"), weight 93 kg (205 lb), SpO2 100%.    Relevant Results  Scheduled medications  Scheduled Medications[4]  Continuous medications  Continuous Medications[5]  PRN medications  PRN Medications[6]  CT abdomen pelvis w IV contrast  Result Date: 5/27/2025  Interpreted By:  Roe Rosen, STUDY: CT ABDOMEN PELVIS W IV CONTRAST; ;  5/27/2025 10:27 pm   INDICATION: Signs/Symptoms:Left flank pain paroxysmal for 2 weeks.     COMPARISON: None.   ACCESSION NUMBER(S): [id number]   ORDERING CLINICIAN: TYRELL UGARTE   TECHNIQUE: Axial CT images of the abdomen and pelvis with coronal and sagittal reconstructed images performed after intravenous administration of 75 cc Omnipaque 350.   FINDINGS: LOWER CHEST: No acute abnormality of the lung bases. Normal heart size. BONES: No acute osseous abnormality. ABDOMINAL WALL: Tiny fat containing umbilical hernia.   ABDOMEN:   LIVER: Within normal limits. BILE DUCTS: Normal caliber. GALLBLADDER: No calcified gallstones. No wall thickening. PANCREAS: Within normal limits. SPLEEN: Within normal limits. ADRENALS: Within normal limits. KIDNEYS and URETERS: Focal right lower pole renal cortical scarring. Otherwise, normal.     VESSELS: Within normal limits. RETROPERITONEUM: No pathologically enlarged " retroperitoneal lymph nodes.   PELVIS:   REPRODUCTIVE ORGANS: Prostate is not enlarged. Small hydroceles partially imaged. BLADDER: Within normal limits.   BOWEL: Thickening of the posterior proximal gastric wall with hyperattenuation and trapped foci of air compatible with ulceration and hemorrhage (series 201, images 28-36). No dilated or thickened bowel. Moderate to large volume of colonic stool suggesting constipation.  Normal appendix. PERITONEUM: No ascites or free air, no fluid collection.       Thickening of the posterior proximal gastric wall with hyperattenuation and trapped foci of air compatible with ulceration and hemorrhage (series 201, images 28-36).   Otherwise, no definite evidence of acute pathology.   Partially imaged small hydroceles. Correlate with any scrotal symptoms.   Moderate to large volume of colonic stool suggesting constipation.   Additional findings as discussed above.     MACRO: None   Signed by: Roe Rosen 5/27/2025 11:16 PM Dictation workstation:   [id number]    Results for orders placed or performed during the hospital encounter of 05/27/25 (from the past 24 hours)   CBC with Differential   Result Value Ref Range    WBC 6.8 4.4 - 11.3 x10*3/uL    nRBC 0.0 0.0 - 0.0 /100 WBCs    RBC 4.89 4.50 - 5.90 x10*6/uL    Hemoglobin 15.4 13.5 - 17.5 g/dL    Hematocrit 44.1 41.0 - 52.0 %    MCV 90 80 - 100 fL    MCH 31.5 26.0 - 34.0 pg    MCHC 34.9 32.0 - 36.0 g/dL    RDW 11.6 11.5 - 14.5 %    Platelets 267 150 - 450 x10*3/uL    Neutrophils % 45.3 40.0 - 80.0 %    Immature Granulocytes %, Automated 0.1 0.0 - 0.9 %    Lymphocytes % 45.4 13.0 - 44.0 %    Monocytes % 7.3 2.0 - 10.0 %    Eosinophils % 1.8 0.0 - 6.0 %    Basophils % 0.1 0.0 - 2.0 %    Neutrophils Absolute 3.08 1.20 - 7.70 x10*3/uL    Immature Granulocytes Absolute, Automated 0.01 0.00 - 0.70 x10*3/uL    Lymphocytes Absolute 3.09 1.20 - 4.80 x10*3/uL    Monocytes Absolute 0.50 0.10 - 1.00 x10*3/uL    Eosinophils Absolute 0.12 0.00 -  0.70 x10*3/uL    Basophils Absolute 0.01 0.00 - 0.10 x10*3/uL   Comprehensive Metabolic Panel   Result Value Ref Range    Glucose 95 74 - 99 mg/dL    Sodium 138 136 - 145 mmol/L    Potassium 4.0 3.5 - 5.3 mmol/L    Chloride 101 98 - 107 mmol/L    Bicarbonate 31 21 - 32 mmol/L    Anion Gap 10 10 - 20 mmol/L    Urea Nitrogen 16 6 - 23 mg/dL    Creatinine 0.80 0.50 - 1.30 mg/dL    eGFR >90 >60 mL/min/1.73m*2    Calcium 9.3 8.6 - 10.3 mg/dL    Albumin 4.9 3.4 - 5.0 g/dL    Alkaline Phosphatase 76 33 - 120 U/L    Total Protein 7.6 6.4 - 8.2 g/dL    AST 21 9 - 39 U/L    Bilirubin, Total 0.6 0.0 - 1.2 mg/dL    ALT 23 10 - 52 U/L   Urinalysis with Reflex Culture and Microscopic   Result Value Ref Range    Color, Urine Light-Yellow Light-Yellow, Yellow, Dark-Yellow    Appearance, Urine Clear Clear    Specific Gravity, Urine 1.018 1.005 - 1.035    pH, Urine 6.5 5.0, 5.5, 6.0, 6.5, 7.0, 7.5, 8.0    Protein, Urine NEGATIVE NEGATIVE, 10 (TRACE), 20 (TRACE) mg/dL    Glucose, Urine Normal Normal mg/dL    Blood, Urine NEGATIVE NEGATIVE mg/dL    Ketones, Urine NEGATIVE NEGATIVE mg/dL    Bilirubin, Urine NEGATIVE NEGATIVE mg/dL    Urobilinogen, Urine Normal Normal mg/dL    Nitrite, Urine NEGATIVE NEGATIVE    Leukocyte Esterase, Urine NEGATIVE NEGATIVE       Assessment & Plan  Gastric ulcer  Imaging studies obtained at the time of admission show evidence of a gastric ulcer with concern for active hemorrhage.  These findings were relayed to GI service who recommend patient be n.p.o., given a Protonix bolus and started on twice daily PPI therapy, and will be seen for evaluation in AM.  We anticipate at this time patient will likely undergo EGD.  Pharmacologic anticoagulation is contraindicated in the setting of active bleed.  Given his low risk profile, will defer SCDs.  Flank pain  Flank pain secondary to the above.  Patient will be n.p.o. after midnight.  He may still have p.o. medications with sips of water if necessary however all  attempts will be made to convert medications to IV if possible.  IV morphine, 2 mg versus 4 mg will be made available per pain level as reported by patient.  Additional breakthrough dosing may be made available as indicated.    I spent >75 minutes in the professional and overall care of this patient.      Fransico Rollins DO         [1] No past medical history on file.  [2] No past surgical history on file.  [3] No family history on file.  [4] sucralfate, 1 g, oral, Once  [5]    [6]

## 2025-05-28 NOTE — CARE PLAN
EGD today showed mild gastritis.  PPI 40 mg daily.  Avoid EtOH and marijuana.      Can be discharged from a GI standpoint.    Will sign off.

## 2025-05-28 NOTE — ASSESSMENT & PLAN NOTE
Imaging studies obtained at the time of admission show evidence of a gastric ulcer with concern for active hemorrhage.  These findings were relayed to GI service who recommend patient be n.p.o., given a Protonix bolus and started on twice daily PPI therapy, and will be seen for evaluation in AM.  We anticipate at this time patient will likely undergo EGD.  Pharmacologic anticoagulation is contraindicated in the setting of active bleed.  Given his low risk profile, will defer SCDs.

## 2025-05-28 NOTE — ANESTHESIA PREPROCEDURE EVALUATION
Patient: Brad Meek    Procedure Information       Anesthesia Start Date/Time: 05/28/25 1148    Scheduled providers: Jose Khan MD; Shakir Thompson MD    Procedure: EGD    [place] [facility] [place]            Relevant Problems   Anesthesia (within normal limits)      GI   (+) Gastric ulcer       Clinical information reviewed:   Tobacco  Allergies  Meds   Med Hx  Surg Hx   Fam Hx          NPO Detail:  No data recorded     Physical Exam    Airway  Mallampati: II  TM distance: >3 FB  Neck ROM: full  Mouth opening: 3 or more finger widths     Cardiovascular - normal exam  Rhythm: regular  Rate: normal     Dental - normal exam     Pulmonary - normal exam   Abdominal            Anesthesia Plan    History of general anesthesia?: no  History of complications of general anesthesia?: no    ASA 2     MAC     intravenous induction   Anesthetic plan and risks discussed with patient.  Use of blood products discussed with patient who.    Plan discussed with CAA.

## 2025-05-28 NOTE — CONSULTS
"Reason For Consult  GIB    This note was created using voice recognition transcription software. Despite proofreading, unintentional typographical errors may be present. Please contact the GI office with any questions or concerns.       This is a 25 yo Male with no significant past medical history who presented to [facility] on 5/27/25 with reports of R sided flank pain x 2 weeks.  GI was consulted for GIB.          Subjective  Uncertain if he's taking NSAIDs.  Uncertain about black/bloody stools but doesn't think so.  Has some chest burning.         EGD-Per patient, in 8th grade after a skiing accident  Colonoscopy-Denies  BM-Daily.  Normal consistency.  No bleeding or weight loss.  FHX-Mgrandfather had stomach ulcers  SHX-No tobacco.  Social EtOH (\"more than I should\"), and intermittent use of marijuana  Ab Sx-Denies   NSAIDs- He is uncertain        Allergies: as mentioned in H&P      A 10 point review of system is negative except for what is mentioned in the HPI    Vital Signs: Reviewed    Physical Exam:  General: Polite, calm, resting  Skin:  Warm and dry, no jaundice  HEENT: No scleral icterus, no conjunctival pallor, normocephalic, atraumatic, mucous membranes moist  Neck:  atraumatic, trachea midline, no JVD  Chest:  Clear to auscultation bilaterally. No wheezes, rales, or rhonchi  CV:  Regular rate and rhythm.  Positive S1/S2  Abdomen: no distension, +BS, soft, non-tender to palpation, no rebound tenderness, no guarding, no rigidity, no discernible ascites   Extremities: no lower extremity edema, chronic pigmentary changes, no cyanosis  Neurological:  A&Ox3  Psychiatric: cooperative     Investigations:  Labs, radiological imaging and cardiac work up were reviewed      Objective:         11/8/2024     9:07 AM 5/27/2025     6:09 PM 5/27/2025     7:46 PM 5/27/2025     9:27 PM 5/27/2025    11:45 PM 5/28/2025     4:13 AM 5/28/2025     9:00 AM   Vitals   Systolic 128 125 154 144 124 136 117 " "  Diastolic 73 84 71 83 82 65 70   BP Location Left arm Left arm Right arm Left arm Right arm Right arm Left arm   Heart Rate 84 62 67 63 55 53 52   Temp 36.6 °C (97.8 °F) 36.6 °C (97.8 °F) 36.8 °C (98.2 °F)  36.7 °C (98 °F) 36.6 °C (97.9 °F) 36.8 °C (98.2 °F)   Resp 16 16 20 18 18 16 16   Height 1.88 m (6' 2\") 1.88 m (6' 2\") 1.88 m (6' 2\")       Weight (lb) 200 205 205       BMI 25.68 kg/m2 26.32 kg/m2 26.32 kg/m2       BSA (m2) 2.18 m2 2.2 m2 2.2 m2       Visit Report Report Report Report Report Report            Medications:  Scheduled Medications[1]     Recent Results (from the past 72 hours)   POCT UA Automated manually resulted    Collection Time: 05/27/25  6:44 PM   Result Value Ref Range    POC Color, Urine Light-Yellow Straw, Yellow, Light-Yellow    POC Appearance, Urine Clear Clear    POC Glucose, Urine NEGATIVE NEGATIVE mg/dl    POC Bilirubin, Urine NEGATIVE NEGATIVE    POC Ketones, Urine NEGATIVE NEGATIVE mg/dl    POC Specific Gravity, Urine 1.010 1.005 - 1.035    POC Blood, Urine NEGATIVE NEGATIVE    POC PH, Urine 6.0 No Reference Range Established PH    POC Protein, Urine NEGATIVE NEGATIVE mg/dl    POC Urobilinogen, Urine 0.2 0.2, 1.0 EU/DL    Poc Nitrite, Urine NEGATIVE NEGATIVE    POC Leukocytes, Urine NEGATIVE NEGATIVE   CBC with Differential    Collection Time: 05/27/25  7:57 PM   Result Value Ref Range    WBC 6.8 4.4 - 11.3 x10*3/uL    nRBC 0.0 0.0 - 0.0 /100 WBCs    RBC 4.89 4.50 - 5.90 x10*6/uL    Hemoglobin 15.4 13.5 - 17.5 g/dL    Hematocrit 44.1 41.0 - 52.0 %    MCV 90 80 - 100 fL    MCH 31.5 26.0 - 34.0 pg    MCHC 34.9 32.0 - 36.0 g/dL    RDW 11.6 11.5 - 14.5 %    Platelets 267 150 - 450 x10*3/uL    Neutrophils % 45.3 40.0 - 80.0 %    Immature Granulocytes %, Automated 0.1 0.0 - 0.9 %    Lymphocytes % 45.4 13.0 - 44.0 %    Monocytes % 7.3 2.0 - 10.0 %    Eosinophils % 1.8 0.0 - 6.0 %    Basophils % 0.1 0.0 - 2.0 %    Neutrophils Absolute 3.08 1.20 - 7.70 x10*3/uL    Immature Granulocytes " Absolute, Automated 0.01 0.00 - 0.70 x10*3/uL    Lymphocytes Absolute 3.09 1.20 - 4.80 x10*3/uL    Monocytes Absolute 0.50 0.10 - 1.00 x10*3/uL    Eosinophils Absolute 0.12 0.00 - 0.70 x10*3/uL    Basophils Absolute 0.01 0.00 - 0.10 x10*3/uL   Comprehensive Metabolic Panel    Collection Time: 05/27/25  7:57 PM   Result Value Ref Range    Glucose 95 74 - 99 mg/dL    Sodium 138 136 - 145 mmol/L    Potassium 4.0 3.5 - 5.3 mmol/L    Chloride 101 98 - 107 mmol/L    Bicarbonate 31 21 - 32 mmol/L    Anion Gap 10 10 - 20 mmol/L    Urea Nitrogen 16 6 - 23 mg/dL    Creatinine 0.80 0.50 - 1.30 mg/dL    eGFR >90 >60 mL/min/1.73m*2    Calcium 9.3 8.6 - 10.3 mg/dL    Albumin 4.9 3.4 - 5.0 g/dL    Alkaline Phosphatase 76 33 - 120 U/L    Total Protein 7.6 6.4 - 8.2 g/dL    AST 21 9 - 39 U/L    Bilirubin, Total 0.6 0.0 - 1.2 mg/dL    ALT 23 10 - 52 U/L   Urinalysis with Reflex Culture and Microscopic    Collection Time: 05/27/25  9:43 PM   Result Value Ref Range    Color, Urine Light-Yellow Light-Yellow, Yellow, Dark-Yellow    Appearance, Urine Clear Clear    Specific Gravity, Urine 1.018 1.005 - 1.035    pH, Urine 6.5 5.0, 5.5, 6.0, 6.5, 7.0, 7.5, 8.0    Protein, Urine NEGATIVE NEGATIVE, 10 (TRACE), 20 (TRACE) mg/dL    Glucose, Urine Normal Normal mg/dL    Blood, Urine NEGATIVE NEGATIVE mg/dL    Ketones, Urine NEGATIVE NEGATIVE mg/dL    Bilirubin, Urine NEGATIVE NEGATIVE mg/dL    Urobilinogen, Urine Normal Normal mg/dL    Nitrite, Urine NEGATIVE NEGATIVE    Leukocyte Esterase, Urine NEGATIVE NEGATIVE   Extra Urine Gray Tube    Collection Time: 05/27/25  9:43 PM   Result Value Ref Range    Extra Tube Hold for add-ons.    Type and Screen    Collection Time: 05/28/25  3:09 AM   Result Value Ref Range    ABO TYPE O     Rh TYPE POS     ANTIBODY SCREEN NEG    CBC    Collection Time: 05/28/25  3:09 AM   Result Value Ref Range    WBC 7.3 4.4 - 11.3 x10*3/uL    nRBC 0.0 0.0 - 0.0 /100 WBCs    RBC 4.63 4.50 - 5.90 x10*6/uL    Hemoglobin 14.8  13.5 - 17.5 g/dL    Hematocrit 42.4 41.0 - 52.0 %    MCV 92 80 - 100 fL    MCH 32.0 26.0 - 34.0 pg    MCHC 34.9 32.0 - 36.0 g/dL    RDW 11.6 11.5 - 14.5 %    Platelets 247 150 - 450 x10*3/uL   Comprehensive metabolic panel    Collection Time: 05/28/25  3:09 AM   Result Value Ref Range    Glucose 91 74 - 99 mg/dL    Sodium 137 136 - 145 mmol/L    Potassium 4.2 3.5 - 5.3 mmol/L    Chloride 102 98 - 107 mmol/L    Bicarbonate 30 21 - 32 mmol/L    Anion Gap 9 (L) 10 - 20 mmol/L    Urea Nitrogen 17 6 - 23 mg/dL    Creatinine 1.00 0.50 - 1.30 mg/dL    eGFR >90 >60 mL/min/1.73m*2    Calcium 9.0 8.6 - 10.3 mg/dL    Albumin 4.2 3.4 - 5.0 g/dL    Alkaline Phosphatase 66 33 - 120 U/L    Total Protein 6.5 6.4 - 8.2 g/dL    AST 17 9 - 39 U/L    Bilirubin, Total 0.6 0.0 - 1.2 mg/dL    ALT 18 10 - 52 U/L          Assessment:    This is a 25 yo Male with no significant past medical history who presented to [facility] [place] on 5/27/25 with reports of R sided flank pain x 3-4 weeks.  GI was consulted for GIB.  Uncertain if he's taking NSAIDs or not.  Uncertain if he's having overt bleeding.  Having chest burning.  Drinks EtOH twice weekly.  Per him, he had an EGD in 8th grade with no findings.  Hgb 14.8. CT A/P 5/27 showed thickening of posterior proximal gastric wall compatible with ulceration/hemorrhage and constipation. Will add him for EGD to evaluate CT findings.  Patient denies any constipation issues.    Plan  1.)  GIB-Trend Hgb, monitor for overt bleeding, transfuse as necessary, avoid NSAIDs.  EGD today.      Discussed above patient assessment and plan with Dr. Thompson.    I spent 65 minutes in the professional and overall care of this patient.      05/28/25 at 9:55 AM - DONATO Rouse-CNP          [1] pantoprazole, 40 mg, intravenous, BID

## 2025-05-30 ENCOUNTER — OFFICE VISIT (OUTPATIENT)
Dept: PRIMARY CARE | Facility: CLINIC | Age: 25
End: 2025-05-30
Payer: COMMERCIAL

## 2025-05-30 VITALS
DIASTOLIC BLOOD PRESSURE: 72 MMHG | HEART RATE: 71 BPM | OXYGEN SATURATION: 95 % | WEIGHT: 213.6 LBS | SYSTOLIC BLOOD PRESSURE: 106 MMHG | BODY MASS INDEX: 27.42 KG/M2

## 2025-05-30 DIAGNOSIS — K29.70 GASTRITIS WITHOUT BLEEDING, UNSPECIFIED CHRONICITY, UNSPECIFIED GASTRITIS TYPE: ICD-10-CM

## 2025-05-30 DIAGNOSIS — R10.9 FLANK PAIN: ICD-10-CM

## 2025-05-30 DIAGNOSIS — Z76.89 ENCOUNTER FOR SUPPORT AND COORDINATION OF TRANSITION OF CARE: Primary | ICD-10-CM

## 2025-05-30 PROCEDURE — 1036F TOBACCO NON-USER: CPT | Performed by: EMERGENCY MEDICINE

## 2025-05-30 PROCEDURE — 99496 TRANSJ CARE MGMT HIGH F2F 7D: CPT | Performed by: EMERGENCY MEDICINE

## 2025-05-30 ASSESSMENT — PATIENT HEALTH QUESTIONNAIRE - PHQ9
SUM OF ALL RESPONSES TO PHQ9 QUESTIONS 1 AND 2: 0
1. LITTLE INTEREST OR PLEASURE IN DOING THINGS: NOT AT ALL
2. FEELING DOWN, DEPRESSED OR HOPELESS: NOT AT ALL

## 2025-05-30 NOTE — PROGRESS NOTES
Subjective   Patient ID: Brad Meek is a 24 y.o. male who presents for Hospital Follow-up and Establish Care (Hospital follow up LLQ pain).    Transition of care-patient is seen within 2 days of discharge and does not need a separate phone call    Assessment/Plan   Problem List Items Addressed This Visit       Flank pain   Transitional care management    Gastritis-this is likely secondary to taking high-dose of ibuprofen for a while along with some alcohol.  Patient was counseled extensively about not using NSAIDs  He is on PPI for a month    No other significant past health history and not on any other medications    At his age, does not need any preventive care    Follow-up as needed  Source of history: Nurse, Medical personnel, Medical record, Patient.  History limitation: None.    HPI  Transitional care management     Patient was discharged from [facility] on May 28 and there was interactive contact by patient/family or facility staff on behalf of patient with me/office within 2 working days regarding patient's transition    Patient was recently admitted to the hospital.  Patient's history regarding the reason for hospital admission and the course in the hospital was reviewed with patinet and/or family.  Patient's medical records including lab work, radiology and other medical notes were reviewed.  His medication list prior to admission and discharge medication list are compared and updated.    Patient encounter was done face-to-face    Patient is unable to give detailed history and therefore history is obtained from the chart    History from hospitalization-  Patient reported to the emergency room for left-sided upper abdominal/lower chest pain.  His CT scan was interpreted as possible gastric ulceration with hemorrhage and therefore he was admitted.  His H&H was stable  He underwent EGD which did not show any gastric ulceration but did show some erosions in the stomach lining    Patient  does admit that he has been taking a lot of ibuprofen in the last few weeks    He was started on PPI feels better and was discharged    Allergies[1]    Current Medications[2]    Objective   Visit Vitals  /72   Pulse 71   Wt 96.9 kg (213 lb 9.6 oz)   SpO2 95%   BMI 27.42 kg/m²   Smoking Status Never   BSA 2.25 m²     Physical Exam  Vital signs as per nursing/MA documentation  General appearance: Alert and in no acute distress  HEENT: Normal Inspection  Neck - Normal Inspection  Respiratory : No respiratory distress. Lungs are clear   Cardiovascular: heart rate normal. No gallop  Back - normal inspection  Skin inspection:Warm  Musculoskeletal : No deformities  Neuro : Limited exam. Baseline    Review of Systems   Comprehensive review of systems as allowed by patient condition and nursing input is negative    Results including lab work, imaging reports were reviewed and wherever possible, independently verified    Family History[3]  Social History[4]  Medical History[5]  Surgical History[6]    Charting was completed using voice recognition technology and may include unintended errors.         [1] No Known Allergies  [2]   Current Outpatient Medications   Medication Sig Dispense Refill    pantoprazole (ProtoNix) 40 mg EC tablet Take 1 tablet (40 mg) by mouth once daily in the morning. Take before meals. Do not crush, chew, or split. 30 tablet 0     No current facility-administered medications for this visit.   [3] No family history on file.  [4]   Social History  Socioeconomic History    Marital status: Single   Tobacco Use    Smoking status: Never    Smokeless tobacco: Never   Substance and Sexual Activity    Alcohol use: Yes     Alcohol/week: 3.0 standard drinks of alcohol     Types: 3 Cans of beer per week    Drug use: Never    Sexual activity: Yes     Partners: Female   [5] No past medical history on file.  [6] No past surgical history on file.

## 2025-06-11 LAB
LABORATORY COMMENT REPORT: NORMAL
PATH REPORT.FINAL DX SPEC: NORMAL
PATH REPORT.GROSS SPEC: NORMAL
PATH REPORT.RELEVANT HX SPEC: NORMAL
PATH REPORT.TOTAL CANCER: NORMAL

## 2025-07-21 ENCOUNTER — TELEPHONE (OUTPATIENT)
Dept: PRIMARY CARE | Facility: CLINIC | Age: 25
End: 2025-07-21
Payer: COMMERCIAL

## 2025-07-21 NOTE — TELEPHONE ENCOUNTER
Patient was seen in may for stomach issues and stated he was given an rx from Dr. Khan that seemed to help. Was given a 30 day script and recently finished it and the pain is slowly returning. He is scheduled for this Wednesday but wasn't sure if a refill was able to be sent in.     Only med on list is pantoprazole but wasn't prescribed by Dr. Khan. Please advise.

## 2025-07-23 ENCOUNTER — OFFICE VISIT (OUTPATIENT)
Dept: PRIMARY CARE | Facility: CLINIC | Age: 25
End: 2025-07-23
Payer: COMMERCIAL

## 2025-07-23 VITALS
HEIGHT: 74 IN | DIASTOLIC BLOOD PRESSURE: 78 MMHG | OXYGEN SATURATION: 95 % | HEART RATE: 61 BPM | BODY MASS INDEX: 26.95 KG/M2 | WEIGHT: 210 LBS | SYSTOLIC BLOOD PRESSURE: 133 MMHG

## 2025-07-23 DIAGNOSIS — Z00.00 WELLNESS EXAMINATION: Primary | ICD-10-CM

## 2025-07-23 DIAGNOSIS — K25.9 GASTRIC ULCER, UNSPECIFIED CHRONICITY, UNSPECIFIED WHETHER GASTRIC ULCER HEMORRHAGE OR PERFORATION PRESENT: ICD-10-CM

## 2025-07-23 PROCEDURE — 99395 PREV VISIT EST AGE 18-39: CPT | Performed by: EMERGENCY MEDICINE

## 2025-07-23 PROCEDURE — 3008F BODY MASS INDEX DOCD: CPT | Performed by: EMERGENCY MEDICINE

## 2025-07-23 PROCEDURE — 1036F TOBACCO NON-USER: CPT | Performed by: EMERGENCY MEDICINE

## 2025-07-23 RX ORDER — PANTOPRAZOLE SODIUM 40 MG/1
40 TABLET, DELAYED RELEASE ORAL
Qty: 30 TABLET | Refills: 2 | Status: SHIPPED | OUTPATIENT
Start: 2025-07-23 | End: 2025-08-22

## 2025-07-23 ASSESSMENT — PATIENT HEALTH QUESTIONNAIRE - PHQ9
2. FEELING DOWN, DEPRESSED OR HOPELESS: NOT AT ALL
1. LITTLE INTEREST OR PLEASURE IN DOING THINGS: NOT AT ALL
SUM OF ALL RESPONSES TO PHQ9 QUESTIONS 1 AND 2: 0

## 2025-07-23 NOTE — PROGRESS NOTES
"Subjective   Patient ID: Brad Meek is a 24 y.o. male who presents for GI Problem.    Transition of care-patient is seen within 2 days of discharge and does not need a separate phone call    Assessment/Plan   Problem List Items Addressed This Visit    None  24-year-old for a physical    Gastritis-patient's symptoms were resolved with taking PPI.  Symptoms returned after he stopped taking PPI  I will give him 3-month supply      No other significant past health history and not on any other medications    At his age, does not need any preventive care    Follow-up as needed  Source of history: Nurse, Medical personnel, Medical record, Patient.  History limitation: None.    24-year-old for a physical    History from hospitalization-  Patient reported to the emergency room for left-sided upper abdominal/lower chest pain.  His CT scan was interpreted as possible gastric ulceration with hemorrhage and therefore he was admitted.  His H&H was stable  He underwent EGD which did not show any gastric ulceration but did show some erosions in the stomach lining    Patient does admit that he has been taking a lot of ibuprofen in the last few weeks    He was started on PPI feels better and was discharged    Allergies[1]    Current Medications[2]    Objective   Visit Vitals  /78   Pulse 61   Ht 1.88 m (6' 2\")   Wt 95.3 kg (210 lb)   SpO2 95%   BMI 26.96 kg/m²   Smoking Status Never   BSA 2.23 m²     Physical Exam  Vital signs as per nursing/MA documentation  General appearance: Alert and in no acute distress  HEENT: Normal Inspection  Neck - Normal Inspection  Respiratory : No respiratory distress. Lungs are clear   Cardiovascular: heart rate normal. No gallop  Back - normal inspection  Skin inspection:Warm  Musculoskeletal : No deformities  Neuro : Limited exam. Baseline    Review of Systems   Comprehensive review of systems as allowed by patient condition and nursing input is negative    Results including lab work, " imaging reports were reviewed and wherever possible, independently verified    Family History[3]  Social History[4]  Medical History[5]  Surgical History[6]    Charting was completed using voice recognition technology and may include unintended errors.           [1] No Known Allergies  [2]   Current Outpatient Medications   Medication Sig Dispense Refill    pantoprazole (ProtoNix) 40 mg EC tablet Take 1 tablet (40 mg) by mouth once daily in the morning. Take before meals. Do not crush, chew, or split. 30 tablet 0     No current facility-administered medications for this visit.   [3] No family history on file.  [4]   Social History  Socioeconomic History    Marital status: Single   Tobacco Use    Smoking status: Never    Smokeless tobacco: Never   Substance and Sexual Activity    Alcohol use: Yes     Alcohol/week: 3.0 standard drinks of alcohol     Types: 3 Cans of beer per week    Drug use: Never    Sexual activity: Yes     Partners: Female   [5] History reviewed. No pertinent past medical history.  [6] History reviewed. No pertinent surgical history.

## 2025-08-07 DIAGNOSIS — K25.9 GASTRIC ULCER, UNSPECIFIED CHRONICITY, UNSPECIFIED WHETHER GASTRIC ULCER HEMORRHAGE OR PERFORATION PRESENT: ICD-10-CM

## 2025-08-08 RX ORDER — PANTOPRAZOLE SODIUM 40 MG/1
40 TABLET, DELAYED RELEASE ORAL
Qty: 90 TABLET | Refills: 1 | Status: SHIPPED | OUTPATIENT
Start: 2025-08-08 | End: 2025-09-07

## 2025-08-26 ENCOUNTER — TELEPHONE (OUTPATIENT)
Dept: GASTROENTEROLOGY | Facility: CLINIC | Age: 25
End: 2025-08-26
Payer: COMMERCIAL

## 2025-08-27 ENCOUNTER — APPOINTMENT (OUTPATIENT)
Dept: GASTROENTEROLOGY | Facility: CLINIC | Age: 25
End: 2025-08-27
Payer: COMMERCIAL

## 2025-08-27 VITALS
HEIGHT: 74 IN | OXYGEN SATURATION: 96 % | DIASTOLIC BLOOD PRESSURE: 74 MMHG | HEART RATE: 72 BPM | SYSTOLIC BLOOD PRESSURE: 115 MMHG | WEIGHT: 202 LBS | BODY MASS INDEX: 25.93 KG/M2

## 2025-08-27 DIAGNOSIS — R10.9 FLANK PAIN: Primary | ICD-10-CM

## 2025-08-27 DIAGNOSIS — K25.9 GASTRIC ULCER, UNSPECIFIED CHRONICITY, UNSPECIFIED WHETHER GASTRIC ULCER HEMORRHAGE OR PERFORATION PRESENT: ICD-10-CM

## 2025-08-27 PROCEDURE — 99215 OFFICE O/P EST HI 40 MIN: CPT | Performed by: STUDENT IN AN ORGANIZED HEALTH CARE EDUCATION/TRAINING PROGRAM

## 2025-08-27 PROCEDURE — 3008F BODY MASS INDEX DOCD: CPT | Performed by: STUDENT IN AN ORGANIZED HEALTH CARE EDUCATION/TRAINING PROGRAM

## 2025-08-27 PROCEDURE — 1036F TOBACCO NON-USER: CPT | Performed by: STUDENT IN AN ORGANIZED HEALTH CARE EDUCATION/TRAINING PROGRAM

## 2025-08-27 RX ORDER — PANTOPRAZOLE SODIUM 40 MG/1
40 TABLET, DELAYED RELEASE ORAL 2 TIMES DAILY
Qty: 90 TABLET | Refills: 1 | Status: SHIPPED | OUTPATIENT
Start: 2025-08-27 | End: 2025-09-26

## 2025-09-03 ENCOUNTER — OFFICE VISIT (OUTPATIENT)
Dept: PRIMARY CARE | Facility: CLINIC | Age: 25
End: 2025-09-03
Payer: COMMERCIAL

## 2025-09-03 VITALS
SYSTOLIC BLOOD PRESSURE: 105 MMHG | BODY MASS INDEX: 26.64 KG/M2 | DIASTOLIC BLOOD PRESSURE: 68 MMHG | HEIGHT: 74 IN | WEIGHT: 207.6 LBS | OXYGEN SATURATION: 97 % | HEART RATE: 60 BPM

## 2025-09-03 DIAGNOSIS — M30.3 KAWASAKI DISEASE (MULTI): ICD-10-CM

## 2025-09-03 DIAGNOSIS — Z23 FLU VACCINE NEED: ICD-10-CM

## 2025-09-03 DIAGNOSIS — R52 PAIN: ICD-10-CM

## 2025-09-03 DIAGNOSIS — K25.4 GASTRIC ULCER WITH HEMORRHAGE, UNSPECIFIED CHRONICITY: ICD-10-CM

## 2025-09-03 DIAGNOSIS — M54.2 NECK PAIN: Primary | ICD-10-CM

## 2025-09-03 PROCEDURE — 99213 OFFICE O/P EST LOW 20 MIN: CPT | Performed by: EMERGENCY MEDICINE

## 2025-09-03 PROCEDURE — 3008F BODY MASS INDEX DOCD: CPT | Performed by: EMERGENCY MEDICINE

## 2025-09-03 RX ORDER — ORPHENADRINE CITRATE 100 MG/1
100 TABLET, EXTENDED RELEASE ORAL NIGHTLY PRN
Qty: 10 TABLET | Refills: 0 | Status: SHIPPED | OUTPATIENT
Start: 2025-09-03 | End: 2025-09-13

## 2025-09-04 LAB
GLIADIN IGA SER IA-ACNC: 2.5 U/ML
GLIADIN IGG SER IA-ACNC: 1.9 U/ML
IGA SERPL-MCNC: 226 MG/DL (ref 47–310)
TTG IGA SER-ACNC: 1.9 U/ML
TTG IGG SER-ACNC: <1 U/ML

## 2025-11-24 ENCOUNTER — APPOINTMENT (OUTPATIENT)
Dept: GASTROENTEROLOGY | Facility: CLINIC | Age: 25
End: 2025-11-24
Payer: COMMERCIAL